# Patient Record
Sex: MALE | Race: BLACK OR AFRICAN AMERICAN | URBAN - METROPOLITAN AREA
[De-identification: names, ages, dates, MRNs, and addresses within clinical notes are randomized per-mention and may not be internally consistent; named-entity substitution may affect disease eponyms.]

---

## 2022-07-25 ENCOUNTER — EMERGENCY (EMERGENCY)
Facility: HOSPITAL | Age: 32
LOS: 1 days | Discharge: HOME | End: 2022-07-25
Admitting: EMERGENCY MEDICINE

## 2022-07-25 VITALS
DIASTOLIC BLOOD PRESSURE: 75 MMHG | TEMPERATURE: 98 F | HEART RATE: 73 BPM | RESPIRATION RATE: 18 BRPM | OXYGEN SATURATION: 99 % | SYSTOLIC BLOOD PRESSURE: 150 MMHG

## 2022-07-25 VITALS
RESPIRATION RATE: 16 BRPM | DIASTOLIC BLOOD PRESSURE: 87 MMHG | TEMPERATURE: 99 F | SYSTOLIC BLOOD PRESSURE: 143 MMHG | OXYGEN SATURATION: 98 % | WEIGHT: 179.9 LBS | HEART RATE: 82 BPM

## 2022-07-25 LAB
ALBUMIN SERPL ELPH-MCNC: 4.5 G/DL — SIGNIFICANT CHANGE UP (ref 3.4–5)
ALP SERPL-CCNC: 86 U/L — SIGNIFICANT CHANGE UP (ref 40–120)
ALT FLD-CCNC: 60 U/L — HIGH (ref 12–42)
ANION GAP SERPL CALC-SCNC: 6 MMOL/L — LOW (ref 9–16)
APPEARANCE UR: CLEAR — SIGNIFICANT CHANGE UP
AST SERPL-CCNC: 42 U/L — HIGH (ref 15–37)
BACTERIA # UR AUTO: PRESENT /HPF
BASOPHILS # BLD AUTO: 0.02 K/UL — SIGNIFICANT CHANGE UP (ref 0–0.2)
BASOPHILS NFR BLD AUTO: 0.4 % — SIGNIFICANT CHANGE UP (ref 0–2)
BILIRUB SERPL-MCNC: 0.4 MG/DL — SIGNIFICANT CHANGE UP (ref 0.2–1.2)
BILIRUB UR-MCNC: NEGATIVE — SIGNIFICANT CHANGE UP
BUN SERPL-MCNC: 14 MG/DL — SIGNIFICANT CHANGE UP (ref 7–23)
CALCIUM SERPL-MCNC: 9.1 MG/DL — SIGNIFICANT CHANGE UP (ref 8.5–10.5)
CHLORIDE SERPL-SCNC: 103 MMOL/L — SIGNIFICANT CHANGE UP (ref 96–108)
CO2 SERPL-SCNC: 28 MMOL/L — SIGNIFICANT CHANGE UP (ref 22–31)
COLOR SPEC: YELLOW — SIGNIFICANT CHANGE UP
CREAT SERPL-MCNC: 1.48 MG/DL — HIGH (ref 0.5–1.3)
DIFF PNL FLD: ABNORMAL
EGFR: 65 ML/MIN/1.73M2 — SIGNIFICANT CHANGE UP
EOSINOPHIL # BLD AUTO: 0.05 K/UL — SIGNIFICANT CHANGE UP (ref 0–0.5)
EOSINOPHIL NFR BLD AUTO: 1 % — SIGNIFICANT CHANGE UP (ref 0–6)
EPI CELLS # UR: SIGNIFICANT CHANGE UP /HPF (ref 0–5)
GLUCOSE SERPL-MCNC: 101 MG/DL — HIGH (ref 70–99)
GLUCOSE UR QL: NEGATIVE — SIGNIFICANT CHANGE UP
HCT VFR BLD CALC: 43.6 % — SIGNIFICANT CHANGE UP (ref 39–50)
HGB BLD-MCNC: 14.6 G/DL — SIGNIFICANT CHANGE UP (ref 13–17)
IMM GRANULOCYTES NFR BLD AUTO: 0.4 % — SIGNIFICANT CHANGE UP (ref 0–1.5)
KETONES UR-MCNC: NEGATIVE — SIGNIFICANT CHANGE UP
LEUKOCYTE ESTERASE UR-ACNC: NEGATIVE — SIGNIFICANT CHANGE UP
LYMPHOCYTES # BLD AUTO: 1.79 K/UL — SIGNIFICANT CHANGE UP (ref 1–3.3)
LYMPHOCYTES # BLD AUTO: 36.2 % — SIGNIFICANT CHANGE UP (ref 13–44)
MAGNESIUM SERPL-MCNC: 2.2 MG/DL — SIGNIFICANT CHANGE UP (ref 1.6–2.6)
MCHC RBC-ENTMCNC: 29.9 PG — SIGNIFICANT CHANGE UP (ref 27–34)
MCHC RBC-ENTMCNC: 33.5 GM/DL — SIGNIFICANT CHANGE UP (ref 32–36)
MCV RBC AUTO: 89.2 FL — SIGNIFICANT CHANGE UP (ref 80–100)
MONOCYTES # BLD AUTO: 0.42 K/UL — SIGNIFICANT CHANGE UP (ref 0–0.9)
MONOCYTES NFR BLD AUTO: 8.5 % — SIGNIFICANT CHANGE UP (ref 2–14)
NEUTROPHILS # BLD AUTO: 2.65 K/UL — SIGNIFICANT CHANGE UP (ref 1.8–7.4)
NEUTROPHILS NFR BLD AUTO: 53.5 % — SIGNIFICANT CHANGE UP (ref 43–77)
NITRITE UR-MCNC: NEGATIVE — SIGNIFICANT CHANGE UP
NRBC # BLD: 0 /100 WBCS — SIGNIFICANT CHANGE UP (ref 0–0)
PH UR: 6.5 — SIGNIFICANT CHANGE UP (ref 5–8)
PLATELET # BLD AUTO: 256 K/UL — SIGNIFICANT CHANGE UP (ref 150–400)
POTASSIUM SERPL-MCNC: 4.1 MMOL/L — SIGNIFICANT CHANGE UP (ref 3.5–5.3)
POTASSIUM SERPL-SCNC: 4.1 MMOL/L — SIGNIFICANT CHANGE UP (ref 3.5–5.3)
PROT SERPL-MCNC: 8.4 G/DL — HIGH (ref 6.4–8.2)
PROT UR-MCNC: NEGATIVE MG/DL — SIGNIFICANT CHANGE UP
RBC # BLD: 4.89 M/UL — SIGNIFICANT CHANGE UP (ref 4.2–5.8)
RBC # FLD: 11 % — SIGNIFICANT CHANGE UP (ref 10.3–14.5)
RBC CASTS # UR COMP ASSIST: ABNORMAL /HPF
SODIUM SERPL-SCNC: 137 MMOL/L — SIGNIFICANT CHANGE UP (ref 132–145)
SP GR SPEC: 1.02 — SIGNIFICANT CHANGE UP (ref 1–1.03)
UROBILINOGEN FLD QL: 0.2 E.U./DL — SIGNIFICANT CHANGE UP
WBC # BLD: 4.95 K/UL — SIGNIFICANT CHANGE UP (ref 3.8–10.5)
WBC # FLD AUTO: 4.95 K/UL — SIGNIFICANT CHANGE UP (ref 3.8–10.5)
WBC UR QL: < 5 /HPF — SIGNIFICANT CHANGE UP

## 2022-07-25 PROCEDURE — 70450 CT HEAD/BRAIN W/O DYE: CPT | Mod: 26

## 2022-07-25 PROCEDURE — 99285 EMERGENCY DEPT VISIT HI MDM: CPT

## 2022-07-25 RX ORDER — METOCLOPRAMIDE HCL 10 MG
10 TABLET ORAL ONCE
Refills: 0 | Status: COMPLETED | OUTPATIENT
Start: 2022-07-25 | End: 2022-07-25

## 2022-07-25 RX ORDER — METOCLOPRAMIDE HCL 10 MG
10 TABLET ORAL ONCE
Refills: 0 | Status: DISCONTINUED | OUTPATIENT
Start: 2022-07-25 | End: 2022-07-25

## 2022-07-25 RX ORDER — MECLIZINE HCL 12.5 MG
1 TABLET ORAL
Qty: 15 | Refills: 0
Start: 2022-07-25 | End: 2022-07-29

## 2022-07-25 RX ORDER — MECLIZINE HCL 12.5 MG
50 TABLET ORAL ONCE
Refills: 0 | Status: COMPLETED | OUTPATIENT
Start: 2022-07-25 | End: 2022-07-25

## 2022-07-25 RX ORDER — SODIUM CHLORIDE 9 MG/ML
1000 INJECTION INTRAMUSCULAR; INTRAVENOUS; SUBCUTANEOUS ONCE
Refills: 0 | Status: COMPLETED | OUTPATIENT
Start: 2022-07-25 | End: 2022-07-25

## 2022-07-25 RX ADMIN — Medication 50 MILLIGRAM(S): at 15:46

## 2022-07-25 RX ADMIN — SODIUM CHLORIDE 1000 MILLILITER(S): 9 INJECTION INTRAMUSCULAR; INTRAVENOUS; SUBCUTANEOUS at 15:44

## 2022-07-25 RX ADMIN — Medication 10 MILLIGRAM(S): at 15:53

## 2022-07-25 NOTE — ED PROVIDER NOTE - OBJECTIVE STATEMENT
33 yo male with no sig pmhx presents c/o feeling acute onset of room spinning sensation while sitting at work earlier today. no nausea/vomiting. no visual changes. no LOC. no cp/sob. no hx similar in the past. patient denies vertigo. denies URI symptoms or ear pain.

## 2022-07-25 NOTE — ED PROVIDER NOTE - PATIENT PORTAL LINK FT
Medicare fax from [facility] for DM supplies, routed to Yfn Baum MD, please sign and give to TC to fax  Radha Frazier RN, BSN  Message handled by CLINIC NURSE.   You can access the FollowMyHealth Patient Portal offered by [facility] by registering at the following website: [email or web address]. By joining [facility]’s FollowMyHealth portal, you will also be able to view your health information using other applications (apps) compatible with our system.

## 2022-07-25 NOTE — ED PROVIDER NOTE - CLINICAL SUMMARY MEDICAL DECISION MAKING FREE TEXT BOX
HOME CARE:    Your physician has ordered [facility] to provide you nursing care; therapy and  at home. A caregiver will call you to schedule their first visit.  Please call [facility] agency  at [phone] if you have not heard from them in a day or two after your discharge date.     [facility] nurse is Jodi  [phone] ext [phone]    Adult Protective Services is  [phone]      WOUND CARE:    Left Leg  Wash with mild soap and water, pat dry.   Daily moisturizer to help soften crusting       Right Leg   Wash with mild soap and water, pat dry.   silvasorb   Cover with dry secondary dressing (ABD or gauze)  Change daily and as needed      Bilateral Legs  Low tetras   Tetragrips from just below knees to bases of toes.   May fold over (not roll) at base of toes to control length, do not fold or roll below knees.   Please remove Tetragrips at night, replace on rising in the morning.   Remove with any discomfort, numbness, tingling, foot/toe discoloration    Left Arm  Wash with soap and water  Xeroform covered with gauze or ABD  Kerlix wrapped around  Change daily and as needed  
well appearing young male with vertigo, appears peripheral, no neuro/motor deficits. ct brain neg. labs unremarkable, improved with meclizine, IVF. tolerating po. ambulatory without  difficulty. advised f/u PMD.

## 2022-07-25 NOTE — ED PROVIDER NOTE - PHYSICAL EXAMINATION
CONSTITUTIONAL: Well-appearing; well-nourished; in no apparent distress.   	HEAD: Normocephalic; atraumatic.   	EYES:  clear  	ENT: normal nose; no rhinorrhea; normal pharynx with no erythema or lesions.   	NECK: Supple; non-tender;   	CARDIOVASCULAR: Normal S1, S2; no murmurs, rubs, or gallops. Regular rate and rhythm.   	RESPIRATORY: Breathing easily; breath sounds clear and equal bilaterally; no wheezes, rhonchi, or rales.  	GI: Soft; non-distended; non-tender   	EXT: VELOZ x 4. ambulatory.   	SKIN: Normal for age and race; warm; dry; good turgor; no apparent lesions or rash.   	NEURO: Patient is alert, oriented x person, place and time.  Normal gait and speech.  Cerebellar testing normal:  negative Romberg, normal coordination and normal finger to nose, heal to shin and rapid alternating movements.  No pronator drift.  5/5 bl upper extremity and lower extremity strength. Visual fields intact   PSYCHOLOGICAL: The patient’s mood and manner are appropriate.

## 2022-07-25 NOTE — ED ADULT NURSE NOTE - NSIMPLEMENTINTERV_GEN_ALL_ED
Implemented All Fall Risk Interventions:  [place] to call system. Call bell, personal items and telephone within reach. Instruct patient to call for assistance. Room bathroom lighting operational. Non-slip footwear when patient is off stretcher. Physically safe environment: no spills, clutter or unnecessary equipment. Stretcher in lowest position, wheels locked, appropriate side rails in place. Provide visual cue, wrist band, yellow gown, etc. Monitor gait and stability. Monitor for mental status changes and reorient to person, place, and time. Review medications for side effects contributing to fall risk. Reinforce activity limits and safety measures with patient and family.

## 2022-07-25 NOTE — ED PROVIDER NOTE - NSFOLLOWUPINSTRUCTIONS_ED_ALL_ED_FT
Take meclizine as prescribed as needed for dizziness  Follow up with primary care doctor    Return for worsening or new symptoms or any concerns.      Vertigo      Vertigo is the feeling that you or your surroundings are moving when they are not. This feeling can come and go at any time. Vertigo often goes away on its own. Vertigo can be dangerous if it occurs while you are doing something that could endanger yourself or others, such as driving or operating machinery.    Your health care provider will do tests to try to determine the cause of your vertigo. Tests will also help your health care provider decide how best to treat your condition.      Follow these instructions at home:      Eating and drinking       A comparison of three sample cups showing dark yellow, yellow, and pale yellow urine.       A sign showing that a person should not drink beer, wine, or liquor.     •Dehydration can make vertigo worse. Drink enough fluid to keep your urine pale yellow.      • Do not drink alcohol.      Activity     •Return to your normal activities as told by your health care provider. Ask your health care provider what activities are safe for you.      •In the morning, first sit up on the side of the bed. When you feel okay, stand slowly while you hold onto something until you know that your balance is fine.      •Move slowly. Avoid sudden body or head movements or certain positions, as told by your health care provider.      •If you have trouble walking or keeping your balance, try using a cane for stability. If you feel dizzy or unstable, sit down right away.      •Avoid doing any tasks that would cause danger to you or others if vertigo occurs.      •Avoid bending down if you feel dizzy. Place items in your home so that they are easy for you to reach without bending or leaning over.      • Do not drive or use machinery if you feel dizzy.      General instructions     •Take over-the-counter and prescription medicines only as told by your health care provider.      •Keep all follow-up visits. This is important.        Contact a health care provider if:    •Your medicines do not relieve your vertigo or they make it worse.      •Your condition gets worse or you develop new symptoms.      •You have a fever.      •You develop nausea or vomiting, or if nausea gets worse.      •Your family or friends notice any behavioral changes.      •You have numbness or a prickling and tingling sensation in part of your body.        Get help right away if you:    •Are always dizzy or you faint.      •Develop severe headaches.      •Develop a stiff neck.      •Develop sensitivity to light.      •Have difficulty moving or speaking.      •Have weakness in your hands, arms, or legs.      •Have changes in your hearing or vision.

## 2022-07-25 NOTE — ED ADULT NURSE NOTE - OBJECTIVE STATEMENT
pt. with sudden onset of dizziness today, denies any significant events leading up to dizziness, denies fall or head trauma.

## 2022-07-28 DIAGNOSIS — R42 DIZZINESS AND GIDDINESS: ICD-10-CM

## 2022-07-28 DIAGNOSIS — Z91.013 ALLERGY TO SEAFOOD: ICD-10-CM
